# Patient Record
Sex: FEMALE | Race: WHITE | NOT HISPANIC OR LATINO | Employment: FULL TIME | URBAN - METROPOLITAN AREA
[De-identification: names, ages, dates, MRNs, and addresses within clinical notes are randomized per-mention and may not be internally consistent; named-entity substitution may affect disease eponyms.]

---

## 2023-11-28 ENCOUNTER — OFFICE VISIT (OUTPATIENT)
Dept: GYNECOLOGIC ONCOLOGY | Facility: CLINIC | Age: 63
End: 2023-11-28
Payer: COMMERCIAL

## 2023-11-28 VITALS
HEART RATE: 77 BPM | BODY MASS INDEX: 29.84 KG/M2 | RESPIRATION RATE: 16 BRPM | HEIGHT: 63 IN | WEIGHT: 168.43 LBS | DIASTOLIC BLOOD PRESSURE: 81 MMHG | TEMPERATURE: 97.2 F | OXYGEN SATURATION: 99 % | SYSTOLIC BLOOD PRESSURE: 124 MMHG

## 2023-11-28 DIAGNOSIS — C54.1 ENDOMETRIAL CA (MULTI): Primary | ICD-10-CM

## 2023-11-28 DIAGNOSIS — N39.46 MIXED STRESS AND URGE URINARY INCONTINENCE: ICD-10-CM

## 2023-11-28 PROCEDURE — 99214 OFFICE O/P EST MOD 30 MIN: CPT | Performed by: NURSE PRACTITIONER

## 2023-11-28 PROCEDURE — 3008F BODY MASS INDEX DOCD: CPT | Performed by: NURSE PRACTITIONER

## 2023-11-28 ASSESSMENT — ENCOUNTER SYMPTOMS
LOSS OF SENSATION IN FEET: 0
DEPRESSION: 0
OCCASIONAL FEELINGS OF UNSTEADINESS: 0

## 2023-11-28 ASSESSMENT — PAIN SCALES - GENERAL: PAINLEVEL: 4

## 2023-11-28 NOTE — PROGRESS NOTES
Patient ID: Nia Hirsch is a 63 y.o. female.    Subjective    HPI    The patient presents today in consultation for Dr. 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 Had PMB in May 2021 with daily spotting. Presented to her GYN, she underwent pap testing with [facility]. 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     Tumor History:  - 21- TLH/BSO/SLNBx with stage IA uterine serous carcinoma  - 9/3/21- TB recommendation for chemotherapy and referral to radiation oncology  - 21- Cycle #1 Carbo/taxol with hypersensitivity reaction, CT 22 negative for disease at end of treatment  - 22 - completed vaginal brachytherapy, 5 treatments     Interval History:    She is doing well no vaginal bleeding or spotting, no changes to bowel habits eating and drinking well. Broke her right foot over the summer and notes increased urinary incontinence since she hasn't been able to exercise. Interested in urology referral.     Patient denies any adverse changes to her bowel habits (i.e., hematochezia, melena, diarrhea, fecal incontinence) or bladder habits (i.e., dysuria, hematuria, and polyuria). Denies VB/ Spotting.      She denies fever, chills, chest pain, SOB, nausea, vomiting, diarrhea, constipation, dysuria, or any other concerning signs of symptoms.       A >10 point review of systems was performed and was negative unless mentioned in the Interval History above.      PMH:   Uterine serous carcinoma     Past Surgical History:   TLH/BSO SLNBx     Family History:  Father with non-Hodgkins Lymphoma, sister anal cancer, mother skin cancer, Aunt with breast cancer, cousin testicular  cancer. Otherwise denies a history of gyn related cancers including ovarian, endometrial, breast, pancreas, and GI cancers.      Social History: Denies a history of smoking, alcohol use or recreational drug use. 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  Entered menopause in .  She has  "not used OCPs.  She has not used HRT.       Screening:  -Pap smear: [facility] in 2021. Otherwise normal.   -Mammogram: 2021 with calcification (biopsied and wnl)   -Colonoscopy: NA    Objective    BSA: 1.84 meters squared  /81 (BP Location: Right arm, Patient Position: Sitting, BP Cuff Size: Adult)   Pulse 77   Temp 36.2 °C (97.2 °F) (Temporal)   Resp 16   Ht 1.589 m (5' 2.56\")   Wt 76.4 kg (168 lb 6.9 oz)   SpO2 99%   BMI 30.26 kg/m²      Physical Exam  Vitals and nursing note reviewed.   Constitutional:       Appearance: Normal appearance. She is normal weight.   HENT:      Mouth/Throat:      Mouth: Mucous membranes are moist.      Pharynx: Oropharynx is clear.   Eyes:      Conjunctiva/sclera: Conjunctivae normal.      Pupils: Pupils are equal, round, and reactive to light.   Cardiovascular:      Rate and Rhythm: Normal rate and regular rhythm.   Pulmonary:      Effort: Pulmonary effort is normal.      Breath sounds: Normal breath sounds.   Abdominal:      General: Abdomen is flat. There is no distension.      Palpations: Abdomen is soft. There is no mass.      Tenderness: There is no abdominal tenderness.   Genitourinary:     General: Normal vulva.      Vagina: Normal.      Uterus: Absent.       Rectum: Normal.   Musculoskeletal:         General: Normal range of motion.   Skin:     General: Skin is warm and dry.   Neurological:      Mental Status: She is alert.   Psychiatric:         Mood and Affect: Mood normal.         Behavior: Behavior normal.       Performance Status:  Asymptomatic    Assessment/Plan     62 yo with Stage IA UPSC s/p carbo/taxol and vaginal wall brachytherapy completed on 2/22/22.     # Uterine Serous Carcinoma  - No evidence of recurrence by signs or sxs  - Discussed recurrence sign an sxs   - Alternate NP/MD visits  - Surveillance visit in 3 months     # Urinary Incontinence  - Referral to Dr. Sauceda    # Neuropathy  - On B6, nearly resolved          "

## 2024-02-27 ENCOUNTER — APPOINTMENT (OUTPATIENT)
Dept: OBSTETRICS AND GYNECOLOGY | Facility: CLINIC | Age: 64
End: 2024-02-27
Payer: COMMERCIAL

## 2024-03-05 ENCOUNTER — OFFICE VISIT (OUTPATIENT)
Dept: GYNECOLOGIC ONCOLOGY | Facility: CLINIC | Age: 64
End: 2024-03-05
Payer: COMMERCIAL

## 2024-03-05 DIAGNOSIS — N39.46 MIXED STRESS AND URGE URINARY INCONTINENCE: ICD-10-CM

## 2024-03-05 DIAGNOSIS — C54.1 ENDOMETRIAL CA (MULTI): Primary | ICD-10-CM

## 2024-03-05 PROCEDURE — 99214 OFFICE O/P EST MOD 30 MIN: CPT | Performed by: NURSE PRACTITIONER

## 2024-03-05 PROCEDURE — 3008F BODY MASS INDEX DOCD: CPT | Performed by: NURSE PRACTITIONER

## 2024-03-05 ASSESSMENT — PAIN SCALES - GENERAL: PAINLEVEL: 0-NO PAIN

## 2024-03-05 NOTE — PROGRESS NOTES
Patient ID: Albina Hirsch is a 63 y.o. female.    Subjective    HPI    The patient presents today in consultation for Dr. 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 Had PMB in May 2021 with daily spotting. Presented to her GYN, she underwent pap testing with [facility]. 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     Tumor History:  - 21- TLH/BSO/SLNBx with stage IA uterine serous carcinoma  - 9/3/21- TB recommendation for chemotherapy and referral to radiation oncology  - 21- Cycle #1 Carbo/taxol with hypersensitivity reaction, CT 22 negative for disease at end of treatment  - 22 - completed vaginal brachytherapy, 5 treatments     Interval History:    She is doing well, no vaginal bleeding or spotting, no changes to bowel habits, eating and drinking well. Notes ongoing urinary stress incontinence and will be seeing Dr. Sharma next week.      Patient denies any adverse changes to her bowel habits (i.e., hematochezia, melena, diarrhea, fecal incontinence) or bladder habits (i.e., dysuria, hematuria, and polyuria). Denies VB/ Spotting.      She denies fever, chills, chest pain, SOB, nausea, vomiting, diarrhea, constipation, dysuria, or any other concerning signs of symptoms.       A >10 point review of systems was performed and was negative unless mentioned in the Interval History above.      PMH:   Uterine serous carcinoma     Past Surgical History:   TLH/BSO SLNBx     Family History:  Father with non-Hodgkins Lymphoma, sister anal cancer, mother skin cancer, Aunt with breast cancer, cousin testicular  cancer. Otherwise denies a history of gyn related cancers including ovarian, endometrial, breast, pancreas, and GI cancers.      Social History: Denies a history of smoking, alcohol use or recreational drug use. 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  Entered menopause in .  She has not used OCPs.  She has not used HRT.       Screening:  -Pap  "smear: [facility] in 2021. Otherwise normal.   -Mammogram: 2021 with calcification (biopsied and wnl)   -Colonoscopy: NA    Objective    BSA: 1.83 meters squared  /73 (BP Location: Left arm, Patient Position: Sitting, BP Cuff Size: Large adult)   Pulse 71   Temp 36.1 °C (97 °F) (Temporal)   Resp 18   Ht 1.593 m (5' 2.72\")   Wt 76 kg (167 lb 8.8 oz)   SpO2 99%   BMI 29.95 kg/m²      Physical Exam  Vitals and nursing note reviewed.   Constitutional:       Appearance: Normal appearance. She is normal weight.   HENT:      Mouth/Throat:      Mouth: Mucous membranes are moist.      Pharynx: Oropharynx is clear.   Eyes:      Conjunctiva/sclera: Conjunctivae normal.      Pupils: Pupils are equal, round, and reactive to light.   Cardiovascular:      Rate and Rhythm: Normal rate and regular rhythm.   Pulmonary:      Effort: Pulmonary effort is normal.      Breath sounds: Normal breath sounds.   Abdominal:      General: Abdomen is flat. There is no distension.      Palpations: Abdomen is soft. There is no mass.      Tenderness: There is no abdominal tenderness.   Genitourinary:     General: Normal vulva.      Vagina: Normal.      Uterus: Absent.       Rectum: Normal.   Musculoskeletal:         General: Normal range of motion.   Skin:     General: Skin is warm and dry.   Neurological:      Mental Status: She is alert.   Psychiatric:         Mood and Affect: Mood normal.         Behavior: Behavior normal.       Performance Status:  Asymptomatic    Assessment/Plan     64 yo with Stage IA UPSC s/p carbo/taxol and vaginal wall brachytherapy completed on 2/22/22.     # Uterine Serous Carcinoma  - No evidence of recurrence by signs or sxs  - Discussed recurrence sign an sxs   - Alternate NP/MD visits  - Surveillance visit in 6 months     # Urinary Incontinence  - Has follow up scheduled with Dr. Sharma            "

## 2024-03-06 VITALS
SYSTOLIC BLOOD PRESSURE: 110 MMHG | TEMPERATURE: 97 F | HEIGHT: 63 IN | HEART RATE: 71 BPM | WEIGHT: 167.55 LBS | OXYGEN SATURATION: 99 % | RESPIRATION RATE: 18 BRPM | BODY MASS INDEX: 29.69 KG/M2 | DIASTOLIC BLOOD PRESSURE: 73 MMHG

## 2024-03-19 ENCOUNTER — OFFICE VISIT (OUTPATIENT)
Dept: OBSTETRICS AND GYNECOLOGY | Facility: CLINIC | Age: 64
End: 2024-03-19
Payer: COMMERCIAL

## 2024-03-19 VITALS
BODY MASS INDEX: 30.73 KG/M2 | HEIGHT: 62 IN | DIASTOLIC BLOOD PRESSURE: 72 MMHG | WEIGHT: 167 LBS | SYSTOLIC BLOOD PRESSURE: 122 MMHG

## 2024-03-19 DIAGNOSIS — Z85.42 HISTORY OF UTERINE CANCER: ICD-10-CM

## 2024-03-19 DIAGNOSIS — N39.46 MIXED STRESS AND URGE URINARY INCONTINENCE: ICD-10-CM

## 2024-03-19 DIAGNOSIS — N32.81 OAB (OVERACTIVE BLADDER): Primary | ICD-10-CM

## 2024-03-19 DIAGNOSIS — R15.9 INCONTINENCE OF FECES, UNSPECIFIED FECAL INCONTINENCE TYPE: ICD-10-CM

## 2024-03-19 DIAGNOSIS — Z92.3 HISTORY OF RADIATION THERAPY: ICD-10-CM

## 2024-03-19 LAB
POC APPEARANCE, URINE: CLEAR
POC BILIRUBIN, URINE: NEGATIVE
POC BLOOD, URINE: NEGATIVE
POC COLOR, URINE: YELLOW
POC GLUCOSE, URINE: NEGATIVE MG/DL
POC KETONES, URINE: ABNORMAL MG/DL
POC LEUKOCYTES, URINE: NEGATIVE
POC NITRITE,URINE: NEGATIVE
POC PH, URINE: 6 PH
POC PROTEIN, URINE: NEGATIVE MG/DL
POC SPECIFIC GRAVITY, URINE: >=1.03
POC UROBILINOGEN, URINE: 0.2 EU/DL

## 2024-03-19 PROCEDURE — 51701 INSERT BLADDER CATHETER: CPT | Performed by: OBSTETRICS & GYNECOLOGY

## 2024-03-19 PROCEDURE — 3008F BODY MASS INDEX DOCD: CPT | Performed by: OBSTETRICS & GYNECOLOGY

## 2024-03-19 PROCEDURE — 81003 URINALYSIS AUTO W/O SCOPE: CPT | Performed by: OBSTETRICS & GYNECOLOGY

## 2024-03-19 PROCEDURE — 99215 OFFICE O/P EST HI 40 MIN: CPT | Performed by: OBSTETRICS & GYNECOLOGY

## 2024-03-19 PROCEDURE — 1036F TOBACCO NON-USER: CPT | Performed by: OBSTETRICS & GYNECOLOGY

## 2024-03-19 RX ORDER — CALCIUM CARBONATE/VITAMIN D3 600MG-5MCG
TABLET ORAL EVERY 24 HOURS
COMMUNITY

## 2024-03-19 RX ORDER — MIRABEGRON 25 MG/1
25 TABLET, FILM COATED, EXTENDED RELEASE ORAL DAILY
Qty: 30 TABLET | Refills: 2 | Status: SHIPPED | OUTPATIENT
Start: 2024-03-19 | End: 2024-05-14

## 2024-03-19 RX ORDER — PEDI MULTIVIT NO.16 W-FLUORIDE 0.25 MG
TABLET,CHEWABLE ORAL
COMMUNITY

## 2024-03-19 ASSESSMENT — ENCOUNTER SYMPTOMS
ENDOCRINE NEGATIVE: 1
CARDIOVASCULAR NEGATIVE: 1
CONSTITUTIONAL NEGATIVE: 1
RESPIRATORY NEGATIVE: 1
PSYCHIATRIC NEGATIVE: 1
NEUROLOGICAL NEGATIVE: 1
EYES NEGATIVE: 1
MUSCULOSKELETAL NEGATIVE: 1
GASTROINTESTINAL NEGATIVE: 1

## 2024-03-19 NOTE — PROGRESS NOTES
[facility] Department of Urogynecology   Vikki Sharma MD, MPH   [phone]    ASSESSMENT AND PLAN:   63 year old female with a stage 2 cystocele and rectocele, JUAN with urge > stress and FI. 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 Cecily Delarosa.     Diagnoses:  #1 Mixed urinary incontinence, urge > stress  #2 Fecal incontinence   #3 Cystocele and rectocele, stage 2    Plan:  1. UUI, OAB  - POCT UA with trace ketones.   - PVR = 20mL by straight catheterization.   - We discussed OAB lifestyle changes (i.e., trying to limit fluids to 60oz in total per day, timed voiding every 2-3 hours, stop drinking fluids 3 hours prior to bedtime, and avoiding/limiting bladder irritants such as caffeine, nicotine, artificial sweeteners, acidic/spicy foods, and alcohol).  - Discussed OAB treatment options such as lifestyle changes, PFPT, medications, PTNS, intradetrusor Botox injections, or SNM.   - We discussed that PFPT may help with bladder/pelvic floor restrengthening exercises with an overall goal to better control the bladder and improve urinary symptoms. PFPT includes attending sessions with a specialized licensed female pelvic floor physical therapist who does external with internal vaginal work to ensure she is doing the correct exercises to obtain the most benefit of physical therapy. We reviewed the importance of continuing these home exercises to receive maximum benefit from PFPT. They also teach mind over bladder strategies/urge suppression techniques to reduce the intensity of the urge to void.   - We discussed that with starting an OAB medication the goal would be to allow the detrusor muscle around the bladder to relax and prevent the muscles from spasm/squeezing too frequently to allow the bladder to store more urine which reduces the urge, frequency, and incontinent  episodes.   - She is amenable to pursuing PFPT, stopping fluid intake before bed, and an OAB medication (Myrbetriq ER 25mg).   - Sent Rx of Myrbetriq ER 25mg to her preferred pharmacy with instructions to take 1 pill by mouth daily. Discussed the drug profile and possible medication side effects including slightly elevated blood pressure. Encouraged her to routinely monitor her blood pressure at home when she starts to take this medication. If her blood pressure is elevated after starting Myrbetriq we advised her to discontinue taking the Myrbetriq and to call our office. This medication may take up to 2-4 weeks to reach full effect.  - PFPT requisition sent today and gave her the list of local physical therapists with their corresponding locations/contact information.    2. FI  - We discussed that there are two factors that attribute to fecal incontinence: 1.) stool consistency and 2.) anal sphincter muscle control. Counseled to optimize the consistency of her stool by taking a daily fiber supplement (i.e. Benefiber or Metamucil) which makes the stools formed and easier to control. She may consider going to PFPT to optimize strengthening the anal sphincter muscle tone to learn how to better control stools to prevent FI episodes from occurring.   - The patient is amenable to starting daily Metamucil and PFPT.   - Placed PFPT requisition for her to strengthen her anal sphincter muscle control.   - Reviewed that if PFPT and fiber does not optimize FI/ABL we may discuss pursuing SNM which works to address both her bowel leakage and urinary leakage.     3. JAMSHID  - Discussed etiology of JAMSHID and potential risk factors.  - Reviewed management strategies including PFPT, fitting her with an anti-incontinence ring, urethral bulking injections, and midurethral sling placement.  - We discussed that PFPT would help strengthen the PF muscles to help better support the urethra.   - We discussed the benefits of the anti-incontinence  pessary which is a small silicone ring that is placed intravaginally to help support the urethra to prevent JAMSHID related leakage. She may learn how to manage the pessary at home on her own with taking the pessary in/out prior to intercourse and recommended maintenance at least once every 2 weeks or returning to the clinic every 3 months for pessary maintenance.   - Reviewed JAMSHID third line therapies such as Bulkamid injection or a midurethral sling. She will need UDS prior to pursuing a JAMSHID third line surgery to evaluate bladder functioning, compliance, MUCPs, and capacity since she has completed VBRT in 2022.   - Gave her PI handout on TVT to review and consider since she is primarily interested in the midurethral sling.   - Plan to work on UUI prior to addressing JAMSHID.     4. Cystocele and rectocele, stage 2  - Will consider APR in the future if she is considering a TVT to address her JAMSHID leakage.     Follow up in 4 weeks with Dr. Vikki Sharma for OAB medication check.     Scribe Attestation  By signing my name below, I, Schuyler Cho, attest that this documentation has been prepared under the direction and in the presence of Vikki Sharma MD MPH on 03/19/2024 at 6:22 PM.     Problem List Items Addressed This Visit    None  Visit Diagnoses       OAB (overactive bladder)    -  Primary    Relevant Medications    mirabegron (Myrbetriq) 25 mg tablet extended release 24 hr 24 hr tablet    Other Relevant Orders    Referral to Physical Therapy    Mixed stress and urge urinary incontinence        Relevant Medications    mirabegron (Myrbetriq) 25 mg tablet extended release 24 hr 24 hr tablet    Other Relevant Orders    POCT UA Automated manually resulted (Completed)    Referral to Physical Therapy    Incontinence of feces, unspecified fecal incontinence type        Relevant Orders    Referral to Physical Therapy    History of uterine cancer        History of radiation therapy               I spent a total of 60  minutes in face to face and non face to face time.      Vikki Sharma MD, MPH, FACOG     I Dr. Sharma, personally performed the services described in the documentation as scribed in my presence and confirm it is both complete and accurate.  Vikki Sharma MD, MPH, FACOG      New    HISTORY OF PRESENT ILLNESS:   63 year old female presenting as a new patient referral from ViolaABDULAZIZ Scott-CNP for evaluation of mixed urinary incontinence.     Record Review:   - Dr. Cecily Delarosa Gyn/Onc note RE: Hx of stage 1a UPSC uterine serous carcinoma s/p VICKIE/BSO in 2021 with adjuvant Carbo/Taxol and VBRT completed on 2/22/2022. No evidence of recurrence. Surveillance in 6 months.     Prolapse Symptoms:  - She reports occasionally feeling a vaginal bulge that is intermittent.      Urinary Symptoms:   - Endorses urinary incontinence over the past several years that recently worsened.   - She wears pads daily for UI; goes through 2 pads each day due to incontinence.   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  - Overall she urinates at least 8-11x per day.    - Does endorse JAMSHID episodes with coughing, laughing, sneezing, squatting, activity, etc.   - Nocturia 0-1x per night and denies nocturnal enuresis. However, she wakes up with urgency in the mornings and has UUI on her way to the bathroom.   - No feeling of incomplete bladder emptying.   - Denies hx of Richard and pyelonephritis.     Bowel Symptoms:   - She has varying bowel movements; daily to once every 3 days.   - Prior to her hysterectomy in 2021 she had a longstanding hx of constipation where she would have a BM once every 5-7 days.   - Reports new onset of fecal incontinence with soft stools but no diarrhea; she feels unable to control her bowels and notes insensory fecal loss.   - ABL occurs around 3x per week. She typically has ABL with formed pieces of stool on days she has bowel movements  "described as loose stool seepage and feels she does not completely empty her rectum after BM.   - Her stool consistency is described as [place] scale #5 stools.   - She has never tried Imodium or fiber supplements for her loose stool consistency.     OBGYN History and Sexual Activity:   - , x2   - Denies hx of OASI.   - Currently sexually active and denies dyspareunia.        Past Medical History:     No past medical history on file.       Past Surgical History:     Past Surgical History:   Procedure Laterality Date    BI STEREOTACTIC GUIDED BREAST RIGHT LOCALIZATION AND BIOPSY Right 2021    BI STEREOTACTIC GUIDED BREAST LOCALIZATION AND BIOPSY RIGHT [facility]         Medications:     Prior to Admission medications    Medication Sig Start Date End Date Taking? Authorizing Provider   calcium carbonate-vitamin D3 600 mg-5 mcg (200 unit) tablet Take by mouth once every 24 hours.    Historical Provider, MD   mirabegron (Myrbetriq) 25 mg tablet extended release 24 hr 24 hr tablet Take 1 tablet (25 mg) by mouth once daily. 3/19/24   Vikki Sharma MD MPH   Multivitamins With Fluoride 0.25 mg tablet,chewable as directed Orally    Historical Provider, MD MCKNIGHT  Review of Systems   Constitutional: Negative.    HENT: Negative.     Eyes: Negative.    Respiratory: Negative.     Cardiovascular: Negative.    Gastrointestinal: Negative.    Endocrine: Negative.    Genitourinary:  Positive for enuresis.   Musculoskeletal: Negative.    Neurological: Negative.    Psychiatric/Behavioral: Negative.            PHYSICAL EXAM:    /72   Ht 1.575 m (5' 2\")   Wt 75.8 kg (167 lb)   BMI 30.54 kg/m²     PVR = 20mL by straight catheterization    Declines chaperone for physical exam.      Well developed, well nourished, in no apparent distress.   Neurologic/Psychiatric:  Awake, Alert and Oriented times 3.  Affect normal.     GENITAL/URINARY:     External Genitalia:  The patient has normal appearing external " genitalia, normal skenes and bartholins glands, and a normal hair distribution.  Her vulva is without lesions, erythema or discharge.  It is non-tender with appropriate sensation. Normal sensation over the L buttock. Absent bulbocavernosus and anal wink reflexes. Positive Q-tip test over the bilateral labia and R buttock.       Urethral Meatus:  Size normal, Location normal, Lesions absent, Prolapse absent.     Urethra:  Fullness absent, Masses absent. Negative CST in the supine position immediately after voiding.     Bladder:  Fullness absent, Masses absent, Tenderness absent.     Vagina:  General appearance normal, Estrogen effect normal, Discharge absent, Lesions absent. Normal vaginal epithelium, hypoestrogenic.     Cervix: surgically absent  Uterus:  surgically absent    Anus/Perineum: Normal perineum.     Stress urinary incontinence not demonstrable.       Physical Exam  Genitourinary:      Bladder and urethral meatus normal.      Vaginal cuff intact.     Anterior and posterior vaginal prolapse present.     Mild vaginal atrophy present.     Cervix is absent.      Uterus is absent.      No urethral tenderness, mass or stress urinary incontinence with cough stress test present.      Pelvic Floor: Levator muscle strength is 4/5.     Levator ani not tender, obturator internus not tender and no pelvic spasms present.     Asymmetrical pelvic sensation, anal wink absent and BC reflex absent.   POP-Q measurements were:      Aa: 0, Ba: 0, C: -7     gH: 5, pB: TVL:      Ap: 0, Bp: 0, D: X     Pelvic exam was performed with patient in the lithotomy position.         The patient has 4 out of 5 pelvic floor muscle strength.    She does not have myofascial tenderness on exam.   Her highest pain score on exam is 1     Rectal exam: Visually concentric anal squeeze, normal resting tone, 3/5 squeeze, moderate puborectalis lift.       Data and DIAGNOSTIC STUDIES REVIEWED   Lab Results   Component Value Date    GLUCOSE 95  06/26/2023    CALCIUM 9.3 06/26/2023     06/26/2023    K 4.3 06/26/2023    CO2 26 06/26/2023     06/26/2023    BUN 16 06/26/2023    CREATININE 0.9 06/26/2023     Lab Results   Component Value Date    WBC 4.1 (L) 06/26/2023    HGB 12.9 06/26/2023    HCT 38.9 06/26/2023    MCV 92.8 06/26/2023     06/26/2023

## 2024-04-16 ENCOUNTER — APPOINTMENT (OUTPATIENT)
Dept: OBSTETRICS AND GYNECOLOGY | Facility: CLINIC | Age: 64
End: 2024-04-16
Payer: COMMERCIAL

## 2024-05-14 ENCOUNTER — OFFICE VISIT (OUTPATIENT)
Dept: OBSTETRICS AND GYNECOLOGY | Facility: CLINIC | Age: 64
End: 2024-05-14
Payer: COMMERCIAL

## 2024-05-14 VITALS
DIASTOLIC BLOOD PRESSURE: 72 MMHG | HEIGHT: 62 IN | BODY MASS INDEX: 31.1 KG/M2 | SYSTOLIC BLOOD PRESSURE: 108 MMHG | WEIGHT: 169 LBS

## 2024-05-14 DIAGNOSIS — R15.9 INCONTINENCE OF FECES, UNSPECIFIED FECAL INCONTINENCE TYPE: ICD-10-CM

## 2024-05-14 DIAGNOSIS — N32.81 OAB (OVERACTIVE BLADDER): Primary | ICD-10-CM

## 2024-05-14 DIAGNOSIS — N39.3 SUI (STRESS URINARY INCONTINENCE, FEMALE): ICD-10-CM

## 2024-05-14 PROCEDURE — 3008F BODY MASS INDEX DOCD: CPT | Performed by: OBSTETRICS & GYNECOLOGY

## 2024-05-14 PROCEDURE — 99214 OFFICE O/P EST MOD 30 MIN: CPT | Performed by: OBSTETRICS & GYNECOLOGY

## 2024-05-14 RX ORDER — MIRABEGRON 50 MG/1
50 TABLET, EXTENDED RELEASE ORAL DAILY
Qty: 30 TABLET | Refills: 2 | Status: SHIPPED | OUTPATIENT
Start: 2024-05-14

## 2024-05-15 ASSESSMENT — ENCOUNTER SYMPTOMS
GASTROINTESTINAL NEGATIVE: 1
CONSTITUTIONAL NEGATIVE: 1
PSYCHIATRIC NEGATIVE: 1
ENDOCRINE NEGATIVE: 1
NEUROLOGICAL NEGATIVE: 1
CARDIOVASCULAR NEGATIVE: 1
EYES NEGATIVE: 1
MUSCULOSKELETAL NEGATIVE: 1
RESPIRATORY NEGATIVE: 1

## 2024-05-15 NOTE — PROGRESS NOTES
[facility] Department of Urogynecology   Vikki Sharma MD, MPH   [phone]    ASSESSMENT AND PLAN:   63 year old female with a stage 2 cystocele and rectocele, JUAN with urge > stress and FI. 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 Cecily Delarosa.      Diagnoses:  #1 Mixed urinary incontinence, urge > stress  #2 Fecal incontinence    Plan:  1. UUI, OAB  - The patient has been taking Myrbetriq ER 25mg daily for the past x1 month and reports an overall improvement in her OAB symptoms. However, she feels that her bladder symptoms could be further optimized as she continues to experience mild urinary urgency and fewer UUI episodes.   > She is amenable to increasing her Myrbetriq from 25mg to 50mg daily to optimize urinary urgency symptom management.   - Sent Rx of Myrbetriq ER 50mg to her preferred pharmacy with instructions to take 1 pill by mouth daily.   - If Myrbetriq begins to lose efficacy in the future, we will consider switching to a different medication or alternative third-line therapies such as intradetrusor Botox or SNM.     2. FI  - We encouraged her to schedule her first PFPT visit and she plans go to PFPT continuously throughout the summer as her schedule is more free to do this.     3. JAMSHID  - Planning to go to PFPT to help strengthen her PF muscles to reduce JAMSHID leakage.   - If PFPT does not optimize JAMSHID leakage improvement, we will consider discuss next steps such as fitting her with an anti-incontinence pessary, Bulkamid, or TVT.     Follow up in 4 months with Dr. Vikki Sharma after completing PFPT for FI, JAMSHID, and OAB management.     Scribe Attestation  By signing my name below, IQamar Scribe, attest that this documentation has been prepared under the direction and in the presence of Vikki Sharma MD MPH on 05/14/2024 at 4:41 PM.      Problem List Items Addressed This Visit    None  Visit Diagnoses       OAB (overactive bladder)    -  Primary    Relevant Medications    mirabegron (Myrbetriq) 50 mg tablet extended release 24 hr 24 hr tablet           I Dr. Sharma, personally performed the services described in the documentation as scribed in my presence and confirm it is both complete and accurate.  Vikki Sharma MD, MPH, FACOG      Established    HISTORY OF PRESENT ILLNESS:   63 year old female following up for an OAB medication check.     Record Review:   - 3/19/2024 Dr. Vikki Sharma note RE: UUI/OAB - We started her on Myrbetriq ER 25mg daily. Referred placed for PFPT to address her OAB, FI, and JAMSHID. Gave her information on TVT for JAMSHID but she would like to address her UUI first. Stage 2 cystocele and rectocele - Will consider APR In the future if she is considering a TVT to address JAMSHID leakage.      Urinary Symptoms:   - The patient has been taking Myrbetriq ER 25mg daily for the past x1 month and reports an overall improvement in her OAB symptoms. She is very satisfied with her 90% symptom improvement in her OAB/UUI. However, she feels that her bladder symptoms could be further optimized as she continues to experience mild urinary urgency and fewer UUI episodes.  - She is amenable to increasing the dose of the Myrbetriq to further improve urinary urgency.   - She plans to schedule her first PFPT appointment with a plan to go continuously over the summer in hopes of improving OAB, FI, and JAMSHID leakage.        Past Medical History:     No past medical history on file.       Past Surgical History:     Past Surgical History:   Procedure Laterality Date    BI STEREOTACTIC GUIDED BREAST RIGHT LOCALIZATION AND BIOPSY Right 8/6/2021    BI STEREOTACTIC GUIDED BREAST LOCALIZATION AND BIOPSY RIGHT [facility]         Medications:     Prior to Admission medications    Medication Sig Start Date End Date Taking? Authorizing Provider  "  calcium carbonate-vitamin D3 600 mg-5 mcg (200 unit) tablet Take by mouth once every 24 hours.    Historical Provider, MD   mirabegron (Myrbetriq) 50 mg tablet extended release 24 hr 24 hr tablet Take 1 tablet (50 mg) by mouth once daily. 5/14/24   Vikki Sharma MD MPH   Multivitamins With Fluoride 0.25 mg tablet,chewable as directed Orally    Historical Provider, MD   mirabegron (Myrbetriq) 25 mg tablet extended release 24 hr 24 hr tablet Take 1 tablet (25 mg) by mouth once daily. 3/19/24 5/14/24  Vikki Sharma MD MPH        ROS  Review of Systems   Constitutional: Negative.    HENT: Negative.     Eyes: Negative.    Respiratory: Negative.     Cardiovascular: Negative.    Gastrointestinal: Negative.    Endocrine: Negative.    Genitourinary:  Positive for enuresis and urgency.   Musculoskeletal: Negative.    Neurological: Negative.    Psychiatric/Behavioral: Negative.            PHYSICAL EXAM:    /72   Ht 1.575 m (5' 2\")   Wt 76.7 kg (169 lb)   BMI 30.91 kg/m²   No LMP recorded. Patient is perimenopausal.    Declines chaperone for physical exam.      Well developed, well nourished, in no apparent distress.   Neurologic/Psychiatric:  Awake, Alert and Oriented times 3.  Affect normal.         Data and DIAGNOSTIC STUDIES REVIEWED   Lab Results   Component Value Date    GLUCOSE 95 06/26/2023    CALCIUM 9.3 06/26/2023     06/26/2023    K 4.3 06/26/2023    CO2 26 06/26/2023     06/26/2023    BUN 16 06/26/2023    CREATININE 0.9 06/26/2023     Lab Results   Component Value Date    WBC 4.1 (L) 06/26/2023    HGB 12.9 06/26/2023    HCT 38.9 06/26/2023    MCV 92.8 06/26/2023     06/26/2023      "

## 2024-09-09 PROBLEM — C54.1 ENDOMETRIAL ADENOCARCINOMA (MULTI): Status: ACTIVE | Noted: 2024-09-09

## 2024-09-09 NOTE — PROGRESS NOTES
Patient ID: Albina Cowan is a 64 y.o. female.    Subjective    HPI    Interval History:  She has a cold today and unfortunately has been grieving the passing of her sister and 's mother and it has been difficult transitioning back to work as a . Her mother fell and is in pain also she has a vaginal prolapse and is scheduled to see a GYN provider to evaluate. Notes her UI is much better after starting care with Dr. Sharma, bowel movements, bladder and appetite are normal denies abnormal vaginal bleeding or discharge.         Tumor History:  - 21- TLH/BSO/SLNBx with stage IA uterine serous carcinoma  - 9/3/21- TB recommendation for chemotherapy and referral to radiation oncology  - 21- Cycle #1 Carbo/taxol with hypersensitivity reaction, CT 22 negative for disease at end of treatment  - 22 - completed vaginal brachytherapy, 5 treatments     Interval History:      She denies fever, chills, chest pain, SOB, nausea, vomiting, diarrhea, constipation, dysuria, or any other concerning signs of symptoms.       A >10 point review of systems was performed and was negative unless mentioned in the Interval History above.      PMH:   Uterine serous carcinoma     Past Surgical History:   TLH/BSO SLNBx     Family History:  Father with non-Hodgkins Lymphoma, sister anal cancer, mother skin cancer, Aunt with breast cancer, cousin testicular  cancer. Otherwise denies a history of gyn related cancers including ovarian, endometrial, breast, pancreas, and GI cancers.      Social History: Denies a history of smoking, alcohol use or recreational drug use. 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  Entered menopause in .  She has not used OCPs.  She has not used HRT.       Screening:  -Pap smear: AGC in . Otherwise normal.   -Mammogram:  with calcification (biopsied and wnl)   -Colonoscopy: NA    Objective    BSA: 1.83 meters  squared  /75 (BP Location: Left arm, Patient Position: Sitting, BP Cuff Size: Large adult)   Pulse 72   Temp 36 °C (96.8 °F) (Temporal)   Resp 16   Wt 76.9 kg (169 lb 6.8 oz)   SpO2 99%   BMI 30.99 kg/m²      Physical Exam  Vitals and nursing note reviewed.   Constitutional:       Appearance: Normal appearance. She is normal weight.   HENT:      Head: Normocephalic.      Mouth/Throat:      Mouth: Mucous membranes are moist.      Pharynx: Oropharynx is clear.   Eyes:      Conjunctiva/sclera: Conjunctivae normal.      Pupils: Pupils are equal, round, and reactive to light.   Cardiovascular:      Rate and Rhythm: Normal rate and regular rhythm.      Heart sounds: No murmur heard.     No friction rub. No gallop.   Pulmonary:      Effort: Pulmonary effort is normal.      Breath sounds: Normal breath sounds.   Abdominal:      General: Abdomen is flat. Bowel sounds are normal. There is no distension.      Palpations: Abdomen is soft. There is no mass.      Tenderness: There is no abdominal tenderness.   Genitourinary:     General: Normal vulva.      Vagina: Normal.      Uterus: Absent.       Rectum: Normal.      Comments: Normal external female genitalia without lesions or masses  Speculum exam: Smooth vagina without lesions or masses  Bimanual exam: smooth vagina without lesions or masses, surgically absent uterus, cervix, adnexa    Musculoskeletal:         General: No swelling. Normal range of motion.   Skin:     General: Skin is warm and dry.   Neurological:      Mental Status: She is alert.   Psychiatric:         Mood and Affect: Mood normal.         Behavior: Behavior normal.       Oncology History Overview Note   Tumor History:  - 8/23/21- TLH/BSO/SLNBx with stage IA uterine serous carcinoma  - 9/3/21- TB recommendation for chemotherapy and referral to radiation oncology  - 9/28/21- Cycle #1 Carbo/taxol with hypersensitivity reaction, CT 1/28/22 negative for disease at end of treatment  - 2/22/22 -  completed vaginal brachytherapy, 5 treatments     Endometrial adenocarcinoma (Multi)   9/9/2024 Initial Diagnosis    Endometrial adenocarcinoma (Multi)         Performance Status:  Asymptomatic    Assessment/Plan     64 yo with Stage IA UPSC s/p carbo/taxol and vaginal wall brachytherapy completed on 2/22/22.     # Uterine Serous Carcinoma  - No evidence of recurrence by signs or sxs  - Discussed recurrence sign an sxs   - Alternate NP/MD visits  - Surveillance visit in 6 months with Viola Dutton CNP     # Urinary Incontinence  - Has follow up scheduled with Dr. Sharma  - Improved       Scribe Attestation  By signing my name below, I, Schuyler Collado   attest that this documentation has been prepared under the direction and in the presence of Cecily Delarosa MD.     Provider Attestation - Scribe documentation    All medical record entries made by the Scribe were at my direction and personally dictated by me. I have reviewed the chart and agree that the record accurately reflects my personal performance of the history, physical exam, discussion and plan.    Cecily Delarosa MD

## 2024-09-10 ENCOUNTER — OFFICE VISIT (OUTPATIENT)
Dept: GYNECOLOGIC ONCOLOGY | Facility: CLINIC | Age: 64
End: 2024-09-10
Payer: COMMERCIAL

## 2024-09-10 ENCOUNTER — APPOINTMENT (OUTPATIENT)
Dept: OBSTETRICS AND GYNECOLOGY | Facility: CLINIC | Age: 64
End: 2024-09-10
Payer: COMMERCIAL

## 2024-09-10 VITALS — DIASTOLIC BLOOD PRESSURE: 76 MMHG | WEIGHT: 169 LBS | BODY MASS INDEX: 30.91 KG/M2 | SYSTOLIC BLOOD PRESSURE: 130 MMHG

## 2024-09-10 VITALS
BODY MASS INDEX: 30.99 KG/M2 | SYSTOLIC BLOOD PRESSURE: 116 MMHG | RESPIRATION RATE: 16 BRPM | WEIGHT: 169.42 LBS | HEART RATE: 72 BPM | OXYGEN SATURATION: 99 % | DIASTOLIC BLOOD PRESSURE: 75 MMHG | TEMPERATURE: 96.8 F

## 2024-09-10 DIAGNOSIS — Z85.42 ENCOUNTER FOR FOLLOW-UP SURVEILLANCE OF UTERINE CANCER: Primary | ICD-10-CM

## 2024-09-10 DIAGNOSIS — N32.81 OAB (OVERACTIVE BLADDER): Primary | ICD-10-CM

## 2024-09-10 DIAGNOSIS — Z08 ENCOUNTER FOR FOLLOW-UP SURVEILLANCE OF UTERINE CANCER: Primary | ICD-10-CM

## 2024-09-10 DIAGNOSIS — R15.9 INCONTINENCE OF FECES, UNSPECIFIED FECAL INCONTINENCE TYPE: ICD-10-CM

## 2024-09-10 PROCEDURE — 99214 OFFICE O/P EST MOD 30 MIN: CPT | Performed by: OBSTETRICS & GYNECOLOGY

## 2024-09-10 PROCEDURE — 1036F TOBACCO NON-USER: CPT | Performed by: OBSTETRICS & GYNECOLOGY

## 2024-09-10 PROCEDURE — 99214 OFFICE O/P EST MOD 30 MIN: CPT | Performed by: STUDENT IN AN ORGANIZED HEALTH CARE EDUCATION/TRAINING PROGRAM

## 2024-09-10 PROCEDURE — 1036F TOBACCO NON-USER: CPT | Performed by: STUDENT IN AN ORGANIZED HEALTH CARE EDUCATION/TRAINING PROGRAM

## 2024-09-10 SDOH — ECONOMIC STABILITY: FOOD INSECURITY: WITHIN THE PAST 12 MONTHS, THE FOOD YOU BOUGHT JUST DIDN'T LAST AND YOU DIDN'T HAVE MONEY TO GET MORE.: NEVER TRUE

## 2024-09-10 SDOH — ECONOMIC STABILITY: FOOD INSECURITY: WITHIN THE PAST 12 MONTHS, YOU WORRIED THAT YOUR FOOD WOULD RUN OUT BEFORE YOU GOT MONEY TO BUY MORE.: NEVER TRUE

## 2024-09-10 ASSESSMENT — ENCOUNTER SYMPTOMS
MUSCULOSKELETAL NEGATIVE: 1
PSYCHIATRIC NEGATIVE: 1
CARDIOVASCULAR NEGATIVE: 1
GASTROINTESTINAL NEGATIVE: 1
NEUROLOGICAL NEGATIVE: 1
EYES NEGATIVE: 1
CONSTITUTIONAL NEGATIVE: 1
ENDOCRINE NEGATIVE: 1
RESPIRATORY NEGATIVE: 1

## 2024-09-10 ASSESSMENT — PAIN SCALES - GENERAL: PAINLEVEL: 0-NO PAIN

## 2024-09-10 NOTE — PROGRESS NOTES
[facility] Department of Urogynecology   Vikki Sharma MD, MPH   [phone]    ASSESSMENT AND PLAN:   64 year old female wit a stage 2 cystocele and rectocele, JUAN with urge > stress and FI. 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 Cecily Delarosa.      Diagnoses:  #1 Mixed urinary incontinence, urge > stress  #2 Fecal incontinence      Plan:  1. UUI, OAB  - She plans to continue taking Myrbetriq ER 50mg daily as this has significantly improved her ability to hold her bladder with resolution of UUI episodes.     2. FI  - We advised her to continue daily fiber supplement to bulk the stools making them formed and easier to control as she has not had any FI episodes since starting Metamucil daily.     Follow up in 1 year with Dr. Vikki Sharma for an OAB Rx check-in/refill.     Scribe Attestation  By signing my name below, I, Qamar Daniels, Schuyler, attest that this documentation has been prepared under the direction and in the presence of Vikki Sharma MD MPH on 09/10/2024 at 4:44 PM.     Problem List Items Addressed This Visit    None     I spent a total of 30 minutes in face to face and non face to face time.     I Dr. Sharma, personally performed the services described in the documentation as scribed in my presence and confirm it is both complete and accurate.  Vikki Sharma MD, MPH, FACOG       Vikki Sharma MD, MPH, FACOG     Established    HISTORY OF PRESENT ILLNESS:   64 year old female following up on JUAN with urge > stress.     Record Review:   - 9/10/2024 Dr. Cecily Delarosa Gyn/Onc note RE: 64 yo with Stage IA UPSC s/p carbo/taxol and vaginal wall brachytherapy completed on 2/22/2022. No evidence of recurrence by signs or sxs. RTC for surveillance visit in 6 months with Viola Dutton CNP.   - 5/14/2024 Dr. Vikki Sharma note RE: JUAN with urge >  stress - We increased Myrbetriq from 25mg to 50mg daily as she felt her OAB symptoms were improved with Myrbetriq but could be further optimized at a higher dosage. FI - We encouraged her to schedule PFPT to strengthen anal sphincter muscle tone. JAMSHID - Referred her to PFPT and discussed third line modalities such as TVT and Bulkamid.   - 3/19/2024 Dr. Vikki Sharma note RE: UUI/OAB - We started her on Myrbetriq ER 25mg daily. Referred placed for PFPT to address her OAB, FI, and JAMSHID. Gave her information on TVT for JAMSHID but she would like to address her UUI first. Stage 2 cystocele and rectocele - Will consider APR In the future if she is considering a TVT to address JAMSHID leakage.     Urinary Symptoms:   - She reports taking Myrbetriq ER 50mg daily and notes significant improvement in her UUI leakage.   - Denies any adverse side effects such as worsening hypertension or postnasal drip since starting the increased dose of Myrbetriq.   - Patient is now able to hold her bladder for 2-3+ hours at a time without having UUI accidents.  - She is overall very satisfied with her OAB symptom management with her current medication regimen.     Bowel Symptoms:  - She has been taking daily Metamucil and notes a great improvement in her bowel control with no FI episodes start starting daily fiber supplement.       Past Medical History:     No past medical history on file.       Past Surgical History:     Past Surgical History:   Procedure Laterality Date    BI STEREOTACTIC GUIDED BREAST RIGHT LOCALIZATION AND BIOPSY Right 8/6/2021    BI STEREOTACTIC GUIDED BREAST LOCALIZATION AND BIOPSY RIGHT [facility]         Medications:     Prior to Admission medications    Medication Sig Start Date End Date Taking? Authorizing Provider   calcium carbonate-vitamin D3 600 mg-5 mcg (200 unit) tablet Take by mouth once every 24 hours.    Historical Provider, MD vazquezabegron (Myrbetriq) 50 mg tablet extended release 24 hr 24 hr tablet Take  1 tablet (50 mg) by mouth once daily. 5/14/24   Vikki Sharma MD MPH   Multivitamins With Fluoride 0.25 mg tablet,chewable as directed Orally    Historical Provider, MD MCKNIGHT  Review of Systems   Constitutional: Negative.    HENT: Negative.     Eyes: Negative.    Respiratory: Negative.     Cardiovascular: Negative.    Gastrointestinal: Negative.    Endocrine: Negative.    Genitourinary: Negative.    Musculoskeletal: Negative.    Neurological: Negative.    Psychiatric/Behavioral: Negative.          PHYSICAL EXAM:    /76   Wt 76.7 kg (169 lb)   BMI 30.91 kg/m²   No LMP recorded. Patient is perimenopausal.    Declines chaperone for physical exam.      Well developed, well nourished, in no apparent distress.   Neurologic/Psychiatric:  Awake, Alert and Oriented times 3.  Affect normal.         Data and DIAGNOSTIC STUDIES REVIEWED   Lab Results   Component Value Date    GLUCOSE 95 06/26/2023    CALCIUM 9.3 06/26/2023     06/26/2023    K 4.3 06/26/2023    CO2 26 06/26/2023     06/26/2023    BUN 16 06/26/2023    CREATININE 0.9 06/26/2023     Lab Results   Component Value Date    WBC 4.1 (L) 06/26/2023    HGB 12.9 06/26/2023    HCT 38.9 06/26/2023    MCV 92.8 06/26/2023     06/26/2023

## 2024-09-10 NOTE — PATIENT INSTRUCTIONS
We discussed lifestyle changes includin) Aiming to drink around 60 oz total of fluids per day   2) Avoiding bladder irritants such as caffeine, nicotine, artificial sweeteners   3) Stop drinking fluids 3 hours before bedtime   4) Timed voids every 2-3 hours.      Fiber Therapy:    Fiber acts in the colon (large bowel) to make the stool soft.  If the stool is too hard, the fiber draws water in and makes it softer.  If the stool is too watery, it acts like a 'sponge' and soaks up the liquid.     Many foods contain fiber. Fruits, vegetables, whole grains, and high fiber cereals all contain some fiber. Unfortunately, most of us don’t eat enough and a fiber supplement is a good way to increase soluble fiber intake.     Supplemental fiber comes in many forms. You could choose from:    1.   Powder  2.   Tablets  3.   Wafers/Cookies/Gummies    Some common brands include:    1.   Benefiber (tasteless powder)  2.   Metamucil (powder)  3.   Konsyl (powder)  4.   Citrucel (powder, may cause less gas)  5.   Fiber-Con (tablet)    All of these products work in the same way, but some may work better than others for you.    Dosin.   One tablespoon of powder dissolves in 8-16 oz of water or juice OR  2.   Two tablets with 8-16 oz water or juice OR  3.   Two fiber wafers/cookies with 8-16 oz of water or juice    Take fiber in the morning. Start off using it once per day. You can then increase frequency if needed.    IF FIBER IS NOT TAKEN WITH ADEQUATE WATER/FLUID INTAKE, IT MAY BE CONSTIPATING.  DRINK 6-8 GLASSES OF WATER/LIQUIDS THROUGHOUT THE DAY WHILE TAKING FIBER SUPPLEMENTATION.    Fiber can cause gas/bloating, especially when first starting the treatment.  If this is the case, you can take simethicone (Gas-X) over the counter after meals and at bedtime as needed.    Summary:  1.  Remember: the most common cause of constipation is inadequate water intake during the day. Avoiding dehydration is usually the key to  success with fiber supplementation.  2. Using fiber requires some experimentation- if one brand doesn’t work or causes excessive gas, take another. Also, try varying the form of fiber- for example, if the powder is unpalatable; take the tablets or wafers instead.  3. You may need more than one dose per day- feel free to increase your dose to morning and mid-day if that works better.  4. Results depend on consistency of usage- the more consistent you are in taking fiber, the better the results!

## 2024-09-24 ENCOUNTER — TELEPHONE (OUTPATIENT)
Dept: OBSTETRICS AND GYNECOLOGY | Facility: CLINIC | Age: 64
End: 2024-09-24
Payer: COMMERCIAL

## 2024-09-24 DIAGNOSIS — N32.81 OAB (OVERACTIVE BLADDER): ICD-10-CM

## 2024-09-24 RX ORDER — MIRABEGRON 50 MG/1
50 TABLET, FILM COATED, EXTENDED RELEASE ORAL DAILY
Qty: 30 TABLET | Refills: 2 | Status: SHIPPED | OUTPATIENT
Start: 2024-09-24

## 2024-09-24 NOTE — TELEPHONE ENCOUNTER
Request received for   Requested Prescriptions     Pending Prescriptions Disp Refills    mirabegron (Myrbetriq) 50 mg tablet extended release 24 hr 24 hr tablet 30 tablet 2     Sig: Take 1 tablet (50 mg) by mouth once daily.       Nia Wood was last seen 9/10/2024

## 2024-11-27 DIAGNOSIS — N32.81 OAB (OVERACTIVE BLADDER): ICD-10-CM

## 2024-11-27 RX ORDER — MIRABEGRON 50 MG/1
50 TABLET, FILM COATED, EXTENDED RELEASE ORAL DAILY
Qty: 30 TABLET | Refills: 2 | Status: SHIPPED | OUTPATIENT
Start: 2024-11-27

## 2025-03-11 ENCOUNTER — OFFICE VISIT (OUTPATIENT)
Dept: GYNECOLOGIC ONCOLOGY | Facility: CLINIC | Age: 65
End: 2025-03-11
Payer: COMMERCIAL

## 2025-03-11 VITALS
BODY MASS INDEX: 30.86 KG/M2 | DIASTOLIC BLOOD PRESSURE: 82 MMHG | TEMPERATURE: 97 F | SYSTOLIC BLOOD PRESSURE: 138 MMHG | WEIGHT: 174.16 LBS | HEART RATE: 65 BPM | OXYGEN SATURATION: 98 % | RESPIRATION RATE: 18 BRPM | HEIGHT: 63 IN

## 2025-03-11 DIAGNOSIS — Z08 ENCOUNTER FOR FOLLOW-UP SURVEILLANCE OF UTERINE CANCER: ICD-10-CM

## 2025-03-11 DIAGNOSIS — Z85.42 ENCOUNTER FOR FOLLOW-UP SURVEILLANCE OF UTERINE CANCER: ICD-10-CM

## 2025-03-11 DIAGNOSIS — Z12.31 SCREENING MAMMOGRAM, ENCOUNTER FOR: ICD-10-CM

## 2025-03-11 DIAGNOSIS — C54.1 ENDOMETRIAL CA (MULTI): Primary | ICD-10-CM

## 2025-03-11 PROCEDURE — 3008F BODY MASS INDEX DOCD: CPT | Performed by: NURSE PRACTITIONER

## 2025-03-11 PROCEDURE — 99214 OFFICE O/P EST MOD 30 MIN: CPT | Performed by: NURSE PRACTITIONER

## 2025-03-11 ASSESSMENT — PAIN SCALES - GENERAL: PAINLEVEL_OUTOF10: 0-NO PAIN

## 2025-03-11 NOTE — PROGRESS NOTES
"Patient ID: Albina Cowan is a 64 y.o. female.    Subjective    HPI    Interval History:  She has overall been feeling well. Notes her UI is much better after starting care with Dr. Sharma, bowel movements, bladder and appetite are normal. She denies abnormal vaginal bleeding or discharge. Denies any lower extremity edema. Due for a mammogram.    She denies fever, chills, chest pain, SOB, nausea, vomiting, diarrhea, constipation, dysuria, or any other concerning signs of symptoms.       A >10 point review of systems was performed and was negative unless mentioned in the Interval History above.     Tumor History:  - 22- TLH/BSO/SLNBx with stage IA uterine serous carcinoma  - 9/3/21- TB recommendation for chemotherapy and referral to radiation oncology  - 21- Cycle #1 Carbo/taxol with hypersensitivity reaction, CT 22 negative for disease at end of treatment  - 22 - completed vaginal brachytherapy, 5 treatments        PMH:   Uterine serous carcinoma     Past Surgical History:   TLH/BSO SLNBx     Family History:  Father with non-Hodgkins Lymphoma, sister anal cancer, mother skin cancer, Aunt with breast cancer, cousin testicular  cancer. Otherwise denies a history of gyn related cancers including ovarian, endometrial, breast, pancreas, and GI cancers.      Social History: Denies a history of smoking, alcohol use or recreational drug use. 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  Entered menopause in .  She has not used OCPs.  She has not used HRT.       Screening:  -Pap smear: AGC in . Otherwise normal.   -Mammogram:  with calcification (biopsied and wnl)   -Colonoscopy: NA    Objective    BSA: 1.87 meters squared  /82 (BP Location: Left arm, Patient Position: Sitting, BP Cuff Size: Adult)   Pulse 65   Temp 36.1 °C (97 °F) (Temporal)   Resp 18   Ht (S) 1.593 m (5' 2.72\")   Wt 79 kg (174 lb 2.6 oz)   SpO2 98%   BMI 31.13 kg/m² "      Physical Exam  Vitals and nursing note reviewed.   Constitutional:       Appearance: Normal appearance. She is normal weight.   HENT:      Head: Normocephalic.      Mouth/Throat:      Mouth: Mucous membranes are moist.      Pharynx: Oropharynx is clear.   Eyes:      Conjunctiva/sclera: Conjunctivae normal.      Pupils: Pupils are equal, round, and reactive to light.   Cardiovascular:      Rate and Rhythm: Normal rate and regular rhythm.      Heart sounds: No murmur heard.     No friction rub. No gallop.   Pulmonary:      Effort: Pulmonary effort is normal.      Breath sounds: Normal breath sounds.   Abdominal:      General: Abdomen is flat. Bowel sounds are normal. There is no distension.      Palpations: Abdomen is soft. There is no mass.      Tenderness: There is no abdominal tenderness.   Genitourinary:     General: Normal vulva.      Vagina: Normal.      Uterus: Absent.       Rectum: Normal.      Comments: Normal external female genitalia without lesions or masses  Speculum exam: Smooth vagina without lesions or masses  Bimanual exam: smooth vagina without lesions or masses, surgically absent uterus, cervix, adnexa    Musculoskeletal:         General: No swelling. Normal range of motion.   Skin:     General: Skin is warm and dry.   Neurological:      Mental Status: She is alert.   Psychiatric:         Mood and Affect: Mood normal.         Behavior: Behavior normal.       Oncology History Overview Note   Tumor History:  - 8/23/21- TLH/BSO/SLNBx with stage IA uterine serous carcinoma  - 9/3/21- TB recommendation for chemotherapy and referral to radiation oncology  - 9/28/21- Cycle #1 Carbo/taxol with hypersensitivity reaction, CT 1/28/22 negative for disease at end of treatment  - 2/22/22 - completed vaginal brachytherapy, 5 treatments     Endometrial adenocarcinoma (Multi)   9/9/2024 Initial Diagnosis    Endometrial adenocarcinoma (Multi)         Performance Status:  Asymptomatic    Assessment/Plan     62 yo  with Stage IA UPSC s/p carbo/taxol and vaginal wall brachytherapy completed on 2/22/22.     # Uterine Serous Carcinoma  - No evidence of recurrence by signs or sxs  - Discussed recurrence sign an sxs   - Alternate NP/MD visits  - Surveillance visit in 6 months     # Urinary Incontinence  - Has follow up scheduled with Dr. Sharma  - Improved       Viola Dutton, APRN-CNP

## 2025-03-31 DIAGNOSIS — N32.81 OAB (OVERACTIVE BLADDER): ICD-10-CM

## 2025-03-31 RX ORDER — MIRABEGRON 50 MG/1
50 TABLET, FILM COATED, EXTENDED RELEASE ORAL DAILY
Qty: 30 TABLET | Refills: 6 | Status: SHIPPED | OUTPATIENT
Start: 2025-03-31

## 2025-07-01 ENCOUNTER — APPOINTMENT (OUTPATIENT)
Dept: RADIOLOGY | Facility: HOSPITAL | Age: 65
End: 2025-07-01
Payer: COMMERCIAL

## 2025-07-01 DIAGNOSIS — Z12.31 SCREENING MAMMOGRAM, ENCOUNTER FOR: ICD-10-CM

## 2025-07-01 PROCEDURE — 77067 SCR MAMMO BI INCL CAD: CPT | Performed by: RADIOLOGY

## 2025-07-01 PROCEDURE — 77067 SCR MAMMO BI INCL CAD: CPT

## 2025-07-01 PROCEDURE — 77063 BREAST TOMOSYNTHESIS BI: CPT | Performed by: RADIOLOGY

## 2025-07-03 ENCOUNTER — TELEPHONE (OUTPATIENT)
Dept: GYNECOLOGIC ONCOLOGY | Facility: HOSPITAL | Age: 65
End: 2025-07-03
Payer: COMMERCIAL

## 2025-07-03 NOTE — TELEPHONE ENCOUNTER
Phoned patient to notify that mammogram result showed abnormal finding and additional imaging is recommended.   Left breast ultrasound order has been entered.    Advised patient to call  to schedule ultrasound appointment.     Message left on patient voice mail with breast imaging recommendation and scheduling phone number.

## 2025-07-07 ENCOUNTER — HOSPITAL ENCOUNTER (OUTPATIENT)
Dept: RADIOLOGY | Facility: HOSPITAL | Age: 65
Discharge: HOME | End: 2025-07-07
Payer: COMMERCIAL

## 2025-07-07 DIAGNOSIS — R92.8 OTHER ABNORMAL AND INCONCLUSIVE FINDINGS ON DIAGNOSTIC IMAGING OF BREAST: ICD-10-CM

## 2025-07-07 PROCEDURE — 76982 USE 1ST TARGET LESION: CPT | Mod: LT

## 2025-07-07 PROCEDURE — 76642 ULTRASOUND BREAST LIMITED: CPT | Mod: LEFT SIDE | Performed by: STUDENT IN AN ORGANIZED HEALTH CARE EDUCATION/TRAINING PROGRAM

## 2025-07-07 PROCEDURE — 76642 ULTRASOUND BREAST LIMITED: CPT | Mod: LT

## 2025-09-09 ENCOUNTER — APPOINTMENT (OUTPATIENT)
Dept: OBSTETRICS AND GYNECOLOGY | Facility: CLINIC | Age: 65
End: 2025-09-09
Payer: COMMERCIAL